# Patient Record
Sex: FEMALE | Race: WHITE | Employment: OTHER | URBAN - METROPOLITAN AREA
[De-identification: names, ages, dates, MRNs, and addresses within clinical notes are randomized per-mention and may not be internally consistent; named-entity substitution may affect disease eponyms.]

---

## 2017-10-02 ENCOUNTER — APPOINTMENT (OUTPATIENT)
Dept: GENERAL RADIOLOGY | Facility: HOSPITAL | Age: 77
End: 2017-10-02
Attending: EMERGENCY MEDICINE
Payer: MEDICARE

## 2017-10-02 ENCOUNTER — HOSPITAL ENCOUNTER (EMERGENCY)
Facility: HOSPITAL | Age: 77
Discharge: HOME | End: 2017-10-02
Attending: EMERGENCY MEDICINE
Payer: MEDICARE

## 2017-10-02 VITALS
SYSTOLIC BLOOD PRESSURE: 141 MMHG | OXYGEN SATURATION: 98 % | TEMPERATURE: 98 F | WEIGHT: 143 LBS | RESPIRATION RATE: 18 BRPM | HEIGHT: 61 IN | BODY MASS INDEX: 27 KG/M2 | DIASTOLIC BLOOD PRESSURE: 71 MMHG | HEART RATE: 89 BPM
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
## 2017-10-02 DIAGNOSIS — M25.552 LEFT HIP PAIN: ICD-10-CM

## 2017-10-02 DIAGNOSIS — M54.50 ACUTE BILATERAL LOW BACK PAIN WITHOUT SCIATICA: ICD-10-CM

## 2017-10-02 PROCEDURE — 72100 X-RAY EXAM L-S SPINE 2/3 VWS: CPT | Performed by: EMERGENCY MEDICINE

## 2017-10-02 PROCEDURE — 99283 EMERGENCY DEPT VISIT LOW MDM: CPT

## 2017-10-02 PROCEDURE — 73502 X-RAY EXAM HIP UNI 2-3 VIEWS: CPT | Performed by: EMERGENCY MEDICINE

## 2017-10-02 RX ORDER — TRAMADOL HYDROCHLORIDE 50 MG/1
50 TABLET ORAL EVERY 4 HOURS PRN
Qty: 16 TABLET | Refills: 0 | Status: SHIPPED | OUTPATIENT
Start: 2017-10-02 | End: 2017-10-09

## 2017-10-02 NOTE — ED INITIAL ASSESSMENT (HPI)
Patient states she slipped out of bed today, complains of L leg pain, L groin pain, and L lower back pain x3 days, ambulatory with pain

## 2017-10-06 NOTE — ED PROVIDER NOTES
Patient Seen in: [facility] AND [facility] Emergency Department    History   Patient presents with:  Contusion (musculoskeletal)    Stated Complaint: fall back pain     HPI    69-year-old female presents for evaluation after a fall.   Patient reports she slipped Pulmonary/Chest: Effort normal and breath sounds normal. No respiratory distress. Abdominal: Soft. Bowel sounds are normal. She exhibits no distension. There is no tenderness. There is no rebound and no guarding.    Musculoskeletal: Normal range of vance sciatica    Disposition:  Discharge    Follow-up:  Jean Kiran MD  [address]  [phone]    Schedule an appointment as soon as possible for a visit in 2 days  For follow up      Medications Prescribed:  Discharge [facility]

## 2023-10-02 ENCOUNTER — TELEPHONE (OUTPATIENT)
Dept: SURGERY | Facility: CLINIC | Age: 83
End: 2023-10-02

## 2023-10-02 ENCOUNTER — OFFICE VISIT (OUTPATIENT)
Dept: SURGERY | Facility: CLINIC | Age: 83
End: 2023-10-02
Payer: MEDICARE

## 2023-10-02 VITALS
HEART RATE: 72 BPM | BODY MASS INDEX: 22.26 KG/M2 | DIASTOLIC BLOOD PRESSURE: 50 MMHG | OXYGEN SATURATION: 97 % | WEIGHT: 121 LBS | HEIGHT: 62 IN | SYSTOLIC BLOOD PRESSURE: 120 MMHG

## 2023-10-02 DIAGNOSIS — M54.50 LOW BACK PAIN, UNSPECIFIED BACK PAIN LATERALITY, UNSPECIFIED CHRONICITY, UNSPECIFIED WHETHER SCIATICA PRESENT: Primary | ICD-10-CM

## 2023-10-02 PROBLEM — R13.10 DYSPHAGIA: Status: ACTIVE | Noted: 2023-10-02

## 2023-10-02 PROBLEM — M54.31 SCIATICA OF RIGHT SIDE: Status: ACTIVE | Noted: 2020-05-15

## 2023-10-02 PROBLEM — J20.5 RSV BRONCHITIS: Status: ACTIVE | Noted: 2022-11-06

## 2023-10-02 PROBLEM — T19.2XXA FOREIGN BODY IN VULVA AND VAGINA: Status: ACTIVE | Noted: 2018-05-31

## 2023-10-02 PROBLEM — G47.00 INSOMNIA: Status: ACTIVE | Noted: 2022-02-15

## 2023-10-02 PROBLEM — M17.12 PRIMARY OSTEOARTHRITIS OF LEFT KNEE: Status: ACTIVE | Noted: 2017-02-03

## 2023-10-02 PROBLEM — E11.9 CONTROLLED TYPE 2 DIABETES MELLITUS WITHOUT COMPLICATION, WITHOUT LONG-TERM CURRENT USE OF INSULIN (HCC): Status: ACTIVE | Noted: 2021-10-20

## 2023-10-02 PROBLEM — R05.9 COUGH: Status: ACTIVE | Noted: 2023-10-02

## 2023-10-02 RX ORDER — SODIUM PHOSPHATE,MONO-DIBASIC 19G-7G/118
2000 ENEMA (ML) RECTAL DAILY
COMMUNITY

## 2023-10-02 RX ORDER — GLIMEPIRIDE 1 MG/1
1 TABLET ORAL
COMMUNITY
Start: 2021-09-01

## 2023-10-02 RX ORDER — METOPROLOL TARTRATE 50 MG/1
50 TABLET, FILM COATED ORAL AS DIRECTED
COMMUNITY
Start: 2022-01-07

## 2023-10-02 RX ORDER — ALBUTEROL SULFATE 90 UG/1
2 AEROSOL, METERED RESPIRATORY (INHALATION) EVERY 4 HOURS PRN
COMMUNITY
Start: 2022-10-31

## 2023-10-02 RX ORDER — TRAMADOL HYDROCHLORIDE 50 MG/1
1 TABLET ORAL EVERY 8 HOURS PRN
COMMUNITY
Start: 2021-09-01

## 2023-10-02 RX ORDER — LANCETS
EACH MISCELLANEOUS
COMMUNITY
Start: 2023-08-19

## 2023-10-02 RX ORDER — CYCLOBENZAPRINE HCL 5 MG
TABLET ORAL
COMMUNITY
Start: 2023-09-21

## 2023-10-02 RX ORDER — FENOFIBRATE 134 MG/1
1 CAPSULE ORAL DAILY
COMMUNITY
Start: 2021-03-13

## 2023-10-02 RX ORDER — CEPHALEXIN 500 MG/1
CAPSULE ORAL
COMMUNITY
Start: 2023-04-30

## 2023-10-02 RX ORDER — AMITRIPTYLINE HYDROCHLORIDE 10 MG/1
TABLET, FILM COATED ORAL
COMMUNITY
Start: 2023-05-02 | End: 2023-10-02

## 2023-10-02 RX ORDER — NEOMYCIN SULFATE, POLYMYXIN B SULFATE AND DEXAMETHASONE 3.5; 10000; 1 MG/ML; [USP'U]/ML; MG/ML
SUSPENSION/ DROPS OPHTHALMIC
COMMUNITY
Start: 2023-05-18

## 2023-10-02 RX ORDER — CARVEDILOL 12.5 MG/1
12.5 TABLET ORAL 2 TIMES DAILY
COMMUNITY
Start: 2021-09-01 | End: 2023-10-02

## 2023-10-02 RX ORDER — HYDROCODONE BITARTRATE AND ACETAMINOPHEN 5; 325 MG/1; MG/1
1 TABLET ORAL EVERY 4 HOURS PRN
COMMUNITY
Start: 2023-09-20

## 2023-10-02 RX ORDER — SULFAMETHOXAZOLE AND TRIMETHOPRIM 800; 160 MG/1; MG/1
TABLET ORAL
COMMUNITY
Start: 2023-08-25

## 2023-10-02 RX ORDER — LOSARTAN POTASSIUM 100 MG/1
100 TABLET ORAL DAILY
COMMUNITY
Start: 2021-09-01

## 2023-10-02 RX ORDER — PERPHENAZINE 16 MG/1
TABLET, FILM COATED ORAL
COMMUNITY

## 2023-10-02 RX ORDER — PERPHENAZINE 16 MG/1
TABLET, FILM COATED ORAL
COMMUNITY
Start: 2022-04-06

## 2023-10-02 RX ORDER — AMLODIPINE BESYLATE 10 MG/1
10 TABLET ORAL DAILY
COMMUNITY
Start: 2021-09-01

## 2023-10-02 RX ORDER — METHYLPREDNISOLONE 4 MG/1
TABLET ORAL
COMMUNITY
Start: 2023-07-26 | End: 2023-10-02

## 2023-10-02 RX ORDER — ASPIRIN 81 MG/1
81 TABLET ORAL DAILY
COMMUNITY

## 2023-10-02 RX ORDER — PERPHENAZINE 16 MG/1
1 TABLET, FILM COATED ORAL 2 TIMES DAILY
COMMUNITY
Start: 2022-04-17

## 2023-10-02 RX ORDER — PERPHENAZINE 16 MG/1
TABLET, FILM COATED ORAL
COMMUNITY
Start: 2021-09-17

## 2023-10-02 NOTE — TELEPHONE ENCOUNTER
Discussed with Dr. Dhiraj Salas who saw the patient. He does not recommend a change to STAT at this time. Recommend he call CT and be placed on a wait list for CT review.      Please advise

## 2023-10-02 NOTE — TELEPHONE ENCOUNTER
PT's son Milana Abreu called back. Advised him per RN no needed to have CT done Stat okay with provider to have CT 10/13/2023 as scheduled. He was very appreciative and thankfully.

## 2023-10-02 NOTE — TELEPHONE ENCOUNTER
Pt's son Day Staton calling to speak to RN for advise states Dr. Janet Ryna ordered a CT pelvis scan and has reached out to Central scheduling to schedule imaging. States Central scheduling has scheduled CT for 10/13/2023. Pt has an appointment with Pain management for 10/4/2023 and follow up with Dr. Janet Ryan for 10/16/2023 wants to know if there is anything Dr. Janet Ryan can do to get the CT done any sooner since pt is in pain. Day Staton is requesting a call back if and when the CT pelvis order is changed to stat.

## 2023-10-02 NOTE — TELEPHONE ENCOUNTER
Noted the providers feedback listed below. Noted pt next appt is scheduled for 10/16/2023 with Dr. Xander Knapp    Verbal release verified, pt son is an EC only    Called the pt to inform of the providers feedback.  No answer LMTCB

## 2023-10-02 NOTE — TELEPHONE ENCOUNTER
Patient seen today. CT scan of pelvis was ordered for patient. First available appointment is 10/13/23. Son called concerned due to the level of pain his Mom is in and wants to see if the scan can be done sooner. Do you want to change order to STAT?

## 2023-10-02 NOTE — PROGRESS NOTES
New patient:  Reason for visit: FX in lower back     Estimated time of onset:  week(s)    Numeric Rating Scale: (No Pain) 0  to  10 (Worst Pain)        Pain at Present:  10/10                                                                                                                       Distribution of Pain:    bilateral has pain in her groin area, has N/T in her left leg and foot     Past Treatments for Current Pain Condition:   NSAIDS, Surgery, and Other injection had SX 2019   Response to treatment: some relief    Prior diagnostic testing related to this condition: XR lumbar spine 10. 2.23

## 2023-10-02 NOTE — TELEPHONE ENCOUNTER
Pt brought in imaging disk; CT L spine w/o contrast 09/19/23; MRI L spine w/o contrast 09/27/23; MRI lumbar w/o contrast 03/20/21. Imaging loaded successfully to PACS and returned to pt at check out.

## 2023-10-04 ENCOUNTER — OFFICE VISIT (OUTPATIENT)
Dept: PAIN CLINIC | Facility: CLINIC | Age: 83
End: 2023-10-04
Payer: MEDICARE

## 2023-10-04 VITALS
BODY MASS INDEX: 22 KG/M2 | HEART RATE: 86 BPM | OXYGEN SATURATION: 98 % | WEIGHT: 121 LBS | DIASTOLIC BLOOD PRESSURE: 56 MMHG | SYSTOLIC BLOOD PRESSURE: 100 MMHG

## 2023-10-04 DIAGNOSIS — S32.040A CLOSED COMPRESSION FRACTURE OF L4 LUMBAR VERTEBRA, INITIAL ENCOUNTER (HCC): Primary | ICD-10-CM

## 2023-10-04 PROCEDURE — 99204 OFFICE O/P NEW MOD 45 MIN: CPT | Performed by: PHYSICIAN ASSISTANT

## 2023-10-04 RX ORDER — FLUTICASONE PROPIONATE 50 MCG
1 SPRAY, SUSPENSION (ML) NASAL DAILY
COMMUNITY
Start: 2023-07-06

## 2023-10-04 RX ORDER — VITAMIN B COMPLEX
TABLET, EXTENDED RELEASE ORAL AS DIRECTED
COMMUNITY

## 2023-10-04 RX ORDER — HYDROCODONE BITARTRATE AND ACETAMINOPHEN 5; 325 MG/1; MG/1
1 TABLET ORAL EVERY 8 HOURS PRN
Qty: 21 TABLET | Refills: 0 | Status: SHIPPED | OUTPATIENT
Start: 2023-10-04 | End: 2023-10-11

## 2023-10-05 ENCOUNTER — TELEPHONE (OUTPATIENT)
Dept: PAIN CLINIC | Facility: CLINIC | Age: 83
End: 2023-10-05

## 2023-10-05 NOTE — TELEPHONE ENCOUNTER
If she had it, the results are not available at this time. That said, if there is a pelvic fracture, she will likely be contacted by Dr. Vandana Billingsley office to discuss next steps. If there is no pelvic fracture identified, proceed with the bracing for the L4 fracture, as we discussed yesterday.

## 2023-10-05 NOTE — TELEPHONE ENCOUNTER
Patient's spouse calling to let NGA Wallace know that patient was able to have CT of Pelvis was completed yesterday. Wanting to know next steps regarding brace that was discussed at [address] 10/4/23.

## 2023-10-05 NOTE — TELEPHONE ENCOUNTER
MATA for pt's son, Ellsworth Baumgarten, advising that imaging is not available in pt's chart. Further advised that the report may have been faxed to Dr. Millie Mcdonald office, however we do not have access to that info at this time. Relayed Murali's instructions below and encouraged Ellsworth Baumgarten to call office w/ any questions.

## 2023-10-06 ENCOUNTER — TELEPHONE (OUTPATIENT)
Dept: SURGERY | Facility: CLINIC | Age: 83
End: 2023-10-06

## 2023-10-06 ENCOUNTER — MED REC SCAN ONLY (OUTPATIENT)
Dept: SURGERY | Facility: CLINIC | Age: 83
End: 2023-10-06

## 2023-10-06 NOTE — TELEPHONE ENCOUNTER
Patient's son contacted Pain Clinic and wanted to make sure that once results are viewed he is contacted to discuss plan of care. At patient's son request, he asked we contact Dr. Wang Partida' office to relay message.

## 2023-10-06 NOTE — TELEPHONE ENCOUNTER
Patient's son is caller, states report was faxed to [phone]. Advised patient that is the neurosurgery fax, offered to give him correct fax number. Anne Shea states he does not have access to get to another fax machine. Shelley Leeot that it is possible Neurosurgery will send the information down to our clinic. Anne Shea would like to know what next steps, states \"I am freaking out with the weekend coming up and I don't know what to do. \" Please contact son to further discuss.

## 2023-10-06 NOTE — TELEPHONE ENCOUNTER
Reviewed notes below in this TE. Discussed with BUD Cueva. Imaging results were reviewed by BUD. Written report states Per [facility] radiologist Dr. Saritha Mendoza, \"an additional definitive pelvic fracture or malalignment is not evident on CT. \"    Called patient's son and provided feedback from [facility] provider:    -provider read report and did not note any documentation about a change in fracture.   -if patient is unable to walk without pain, we may order a brace for comfort, otherwise bracing is not recommended for stable compression fractures. Patient's son Rubina Pittman acknowledged above and stated that:  -he has been placing many calls and is happy to have some feedback. -he was told that imaging disc was sent by [facility] to [address] [place] [address] by mistake.   -he is unsure if patient should continue to take [address] as ordered.   -his mother's \"pain has been going up and down the past few days\". -she is able to walk around but is resting a lot. -he has a disc at home from most recent CT, but needs one to be mailed to [facility] since they live so far away. Discussed using Norco as ordered by [facility] PA. Rubina Pittman acknowledged. Discussed using lidocaine patches, ice, and may give Tylenol in between Norco doses, making sure to monitor total amount of Tylenol per day, staying below 4,000 mg's. Rubina Pittman acknowledged and thanked Nursing for the feedback and the call. Called [facility] and spoke with [facility]. Per Trina Hi is to fax contact information and imaging disc can be mailed to [facility]. Fax cover sheet and patient face sheet with [facility] contact information faxed to number:  [phone]    Routed to NGA Hitchcock.

## 2023-10-06 NOTE — TELEPHONE ENCOUNTER
Patient's son calling regarding advice for brace disscussed at [facility]. Reiterated to patient's son that Dr. Zonia Humphreys office is currently reviewing the results of the CT scan and the brace is only an option if the CT presents no pelvic fracture. If a fracture is present Dr. Rashid Du' office will proceed with a POC. Patient's son voiced understanding and requested to be called regarding the results of the CT scan when available at [phone].

## 2023-10-06 NOTE — TELEPHONE ENCOUNTER
Contacted pt's son, Duncan Porras, to advise that the POC has not changed from Murali's standpoint: if CT does not show a fracture in the pelvis, he can  the brace that was ordered. Further advised that Dr. Nallely Parks will provide an alt POC if the CT does show a fracture. Encouraged Duncan Porras to contact Dr. Stacey Neves office to inquire about the CT results and next steps. Confirmed for Duncan Porras that chart indicates that report was rcv'd via fax this morning.

## 2023-10-06 NOTE — TELEPHONE ENCOUNTER
CT Pelvis imaging result report DOS 10/4/2023 at [address] received by [facility] clinical staff, endorsed to provider for review. Mary Nunez's desk for review    Will be sent to scanning once received back from provider.

## 2023-10-06 NOTE — TELEPHONE ENCOUNTER
Received results from [address] [place] [address] for CT of pelvis and put in nurses been for Dr savage

## 2023-10-10 ENCOUNTER — TELEPHONE (OUTPATIENT)
Dept: SURGERY | Facility: CLINIC | Age: 83
End: 2023-10-10

## 2023-10-10 NOTE — TELEPHONE ENCOUNTER
Noted Dr. Britt Simpson has availability for tomorrow 10/11/2023 in [place] if the pt and pt family is willing to Drive to the [place] location

## 2023-10-10 NOTE — TELEPHONE ENCOUNTER
Pt's son called to see if pt could be seen sooner than 10/16/23. Pt's son also wanted to advise he has imaging disk in hand for Dr Adilene Morales to review imaging.  Please advise

## 2023-10-10 NOTE — TELEPHONE ENCOUNTER
Reached out to patient's son Parveen Burt who stated they are not willing to make the drive to [place] states it would be too long of a drive for patient.

## 2023-10-12 ENCOUNTER — TELEPHONE (OUTPATIENT)
Dept: SURGERY | Facility: CLINIC | Age: 83
End: 2023-10-12

## 2023-10-12 NOTE — TELEPHONE ENCOUNTER
Rcvd imaging disc through the mail. MRI L-Spine W/O contrast 9/27/23, CT pelvis for Bone Detail WO contrast 10/4/23, and CT Lumbar spine WO contrast 9/19/23. Imaging successfully uploaded into PACS;Imaging report in nurses bin.  Imaging disc in drawer, lvm to pt regarding mailing the disc back, or setting up a new appt. Please advise

## (undated) NOTE — ED AVS SNAPSHOT
Asa Arch   MRN: [id number]    Department:  [facility] Emergency Department   Date of Visit:  10/2/2017           Disclosure     Insurance plans vary and the physician(s) referred by the ER may not be covered by your plan.  Please conta CARE PHYSICIAN AT ONCE OR RETURN IMMEDIATELY TO THE EMERGENCY DEPARTMENT. If you have been prescribed any medication(s), please fill your prescription right away and begin taking the medication(s) as directed.   If you believe that any of the medications